# Patient Record
Sex: FEMALE | Race: BLACK OR AFRICAN AMERICAN | NOT HISPANIC OR LATINO | URBAN - METROPOLITAN AREA
[De-identification: names, ages, dates, MRNs, and addresses within clinical notes are randomized per-mention and may not be internally consistent; named-entity substitution may affect disease eponyms.]

---

## 2023-11-24 ENCOUNTER — EMERGENCY (EMERGENCY)
Facility: HOSPITAL | Age: 53
LOS: 1 days | Discharge: HOME | End: 2023-11-24
Attending: EMERGENCY MEDICINE
Payer: COMMERCIAL

## 2023-11-24 VITALS
DIASTOLIC BLOOD PRESSURE: 74 MMHG | RESPIRATION RATE: 16 BRPM | SYSTOLIC BLOOD PRESSURE: 136 MMHG | TEMPERATURE: 98 F | OXYGEN SATURATION: 99 % | HEART RATE: 88 BPM

## 2023-11-24 VITALS
SYSTOLIC BLOOD PRESSURE: 154 MMHG | OXYGEN SATURATION: 100 % | RESPIRATION RATE: 18 BRPM | TEMPERATURE: 98 F | HEART RATE: 108 BPM | DIASTOLIC BLOOD PRESSURE: 88 MMHG | WEIGHT: 196.21 LBS

## 2023-11-24 PROCEDURE — 99284 EMERGENCY DEPT VISIT MOD MDM: CPT

## 2023-11-24 PROCEDURE — 96375 TX/PRO/DX INJ NEW DRUG ADDON: CPT

## 2023-11-24 PROCEDURE — 96365 THER/PROPH/DIAG IV INF INIT: CPT

## 2023-11-24 PROCEDURE — 99284 EMERGENCY DEPT VISIT MOD MDM: CPT | Mod: 25

## 2023-11-24 RX ORDER — OXYCODONE AND ACETAMINOPHEN 5; 325 MG/1; MG/1
1 TABLET ORAL
Qty: 9 | Refills: 0
Start: 2023-11-24 | End: 2023-11-26

## 2023-11-24 RX ORDER — KETOROLAC TROMETHAMINE 30 MG/ML
30 SYRINGE (ML) INJECTION ONCE
Refills: 0 | Status: DISCONTINUED | OUTPATIENT
Start: 2023-11-24 | End: 2023-11-24

## 2023-11-24 RX ORDER — SODIUM CHLORIDE 9 MG/ML
3 INJECTION INTRAMUSCULAR; INTRAVENOUS; SUBCUTANEOUS ONCE
Refills: 0 | Status: COMPLETED | OUTPATIENT
Start: 2023-11-24 | End: 2023-11-24

## 2023-11-24 RX ORDER — MORPHINE SULFATE 50 MG/1
2 CAPSULE, EXTENDED RELEASE ORAL ONCE
Refills: 0 | Status: DISCONTINUED | OUTPATIENT
Start: 2023-11-24 | End: 2023-11-24

## 2023-11-24 RX ORDER — DEXAMETHASONE 0.5 MG/5ML
10 ELIXIR ORAL ONCE
Refills: 0 | Status: COMPLETED | OUTPATIENT
Start: 2023-11-24 | End: 2023-11-24

## 2023-11-24 RX ADMIN — Medication 30 MILLIGRAM(S): at 16:56

## 2023-11-24 RX ADMIN — MORPHINE SULFATE 2 MILLIGRAM(S): 50 CAPSULE, EXTENDED RELEASE ORAL at 17:27

## 2023-11-24 RX ADMIN — Medication 30 MILLIGRAM(S): at 17:26

## 2023-11-24 RX ADMIN — Medication 102 MILLIGRAM(S): at 17:05

## 2023-11-24 RX ADMIN — Medication 10 MILLIGRAM(S): at 17:35

## 2023-11-24 RX ADMIN — MORPHINE SULFATE 2 MILLIGRAM(S): 50 CAPSULE, EXTENDED RELEASE ORAL at 16:57

## 2023-11-24 RX ADMIN — SODIUM CHLORIDE 3 MILLILITER(S): 9 INJECTION INTRAMUSCULAR; INTRAVENOUS; SUBCUTANEOUS at 17:01

## 2023-11-24 NOTE — ED PROVIDER NOTE - MUSCULOSKELETAL, MLM
Marked tenderness over lower back but able to weight-bear. Walks slowly, reflexes equal and symmetrical. Sensations okay.

## 2023-11-24 NOTE — ED PROVIDER NOTE - PATIENT PORTAL LINK FT
You can access the FollowMyHealth Patient Portal offered by [facility] by registering at the following website: [email or web address]. By joining [facility] ’s FollowMyHealth portal, you will also be able to view your health information using other applications (apps) compatible with our system.

## 2023-11-24 NOTE — ED PROVIDER NOTE - NSFOLLOWUPINSTRUCTIONS_ED_ALL_ED_FT
take meds as directed  follow up with your doctor          English    Managing Chronic Back Pain  Chronic back pain is back pain that lasts for 12 weeks or longer. It often affects the lower back. Back pain may feel like a muscle ache or a sharp, stabbing pain. It can be mild, moderate, or severe.    If you have been diagnosed with chronic back pain, there are things you can do to manage your symptoms. You may have to try different things to see what works best for you. Your health care provider may also give you specific instructions.    How to manage lifestyle changes  Treating chronic back pain often starts with rest and pain relief, followed by exercises to restore movement and strength to your back (physical therapy). You may need surgery if other treatments do not help, or if your pain is caused by a condition or an injury. Follow your treatment plan as told by your health care provider. This may include:  Relaxation techniques.  Talk therapy or counseling with a mental health specialist. A form of talk therapy called cognitive behavioral therapy (CBT) can be especially helpful. This therapy helps you set goals and follow up on the changes that you make.  Acupuncture or massage therapy.  Local electrical stimulation.  Injections. These deliver numbing or pain-relieving medicines into your spine or the area of pain.  How to recognize changes in your chronic back pain  Your condition may improve with treatment. However, back pain may not go away or may get worse over time. Watch your symptoms carefully and let your health care provider know if your symptoms get worse or do not improve.    Your back pain may be getting worse if you have:  Pain that begins to cause problems with posture.  Pain that gets worse when you are sitting, standing, walking, bending, or lifting.  Pain that affects you while you are active, or at rest, or both.  Pain that eventually makes it hard to move around (limits mobility).  Pain that occurs with fever, weight loss, or difficulty urinating.  Pain that causes numbness and tingling.  How to use body mechanics and posture to help with pain  Healthy body mechanics and good posture can help to relieve stress on your back. Body mechanics refers to the movements and positions of your body during your daily activities. Posture is part of body mechanics. Good posture means:  Your spine is in its natural S-curve, or neutral, position.  Your shoulders are pulled back slightly.  Your head is not tipped forward.  Follow these guidelines to improve your posture and body mechanics in your everyday activities.    Standing      When standing, keep your spine neutral and your feet about hip-width apart. Keep your knees slightly bent. Your ears, shoulders, and hips should line up.  When you do a task in which you  one place for a long time, place one foot on a stable object that is 2–4 inches (5–10 cm) high, such as a footstool. This helps keep your spine neutral.  Sitting      When sitting, keep your spine neutral and your feet flat on the floor. Use a footrest, if necessary, and keep your thighs parallel to the floor. Avoid rounding your shoulders, and avoid tilting your head forward.  When working at a desk or a computer, keep your desk at a height where your hands are slightly lower than your elbows. Slide your chair under your desk so you are close enough to maintain good posture.  When working at a computer, place your monitor at a height where you are looking straight ahead and you do not have to tilt your head forward or downward to view the screen.  Lifting      Keep your feet at least shoulder-width apart and tighten the muscles of your abdomen.  Bend your knees and hips and keep your spine neutral. Be sure to lift using the strength of your legs, not your back. Do not lock your knees straight out.  Always ask for help to lift heavy or awkward objects.  Resting      When lying down and resting, avoid positions that are most painful.  If you have pain with activities such as sitting, bending, stooping, or squatting, lie in a position in which your body does not bend very much. For example, avoid curling up on your side with your arms and knees near your chest (fetal position).  If you have pain with activities such as standing for a long time or reaching with your arms, lie with your spine in a neutral position and bend your knees slightly. Try:  Lying on your side with a pillow between your knees.  Lying on your back with a pillow under your knees.  Follow these instructions at home:  Medicines    Treatment may include over-the-counter or prescription medicines for pain and inflammation that are taken by mouth or applied to the skin. Another treatment may include muscle relaxants. Take over-the-counter and prescription medicines only as told by your health care provider.  Ask your health care provider if the medicine prescribed to you:  Requires you to avoid driving or using machinery.  Can cause constipation. You may need to take these actions to prevent or treat constipation:  Drink enough fluid to keep your urine pale yellow.  Take over-the-counter or prescription medicines.  Eat foods that are high in fiber, such as beans, whole grains, and fresh fruits and vegetables.  Limit foods that are high in fat and processed sugars, such as fried or sweet foods.  Lifestyle    Do not use any products that contain nicotine or tobacco, such as cigarettes, e-cigarettes, and chewing tobacco. If you need help quitting, ask your health care provider.  Eat a healthy diet that includes foods such as vegetables, fruits, fish, and lean meats.  Work with your health care provider to achieve or maintain a healthy weight.  General instructions    Get regular exercise as told. Exercise improves flexibility and strength.  If physical therapy was prescribed, do exercises as told by your health care provider.  Use ice or heat therapy as told by your health care provider.  Keep all follow-up visits as told by your health care provider. This is important.  Where can I get support?  Consider joining a support group for people managing chronic back pain. Ask your health care provider about support groups in your area. You can also find online and in-person support groups through:  The American Chronic Pain Association: theacpa.org  Pain Connection Program: painconnection.org  Contact a health care provider if:  You have pain that is not relieved with rest or medicine.  Your pain gets worse, or you have new pain.  You have a fever.  You have rapid weight loss.  You have trouble doing your normal activities.  Get help right away if:  You have weakness or numbness in one or both of your legs or feet.  You have trouble controlling your bladder or your bowels.  You have severe back pain and have any of the following:  Nausea or vomiting.  Abdominal pain.  Shortness of breath or you faint.  Summary  Chronic back pain is often treated with rest, pain relief, and physical therapy.  Talk therapy, acupuncture, massage, and local electrical stimulation may help.  Follow your treatment plan as told by your health care provider.  Joining a support group may help you manage chronic back pain.  This information is not intended to replace advice given to you by your health care provider. Make sure you discuss any questions you have with your health care provider.    Document Revised: 01/28/2021 Document Reviewed: 10/06/2020  Elsevier Patient Education © 2023 [email or web address]

## 2023-11-24 NOTE — ED ADULT TRIAGE NOTE - CHIEF COMPLAINT QUOTE
Pt c/o lower back pain, hx multiple back injuries in 2007 and 2018, following up with orthopedics outpatient, last MRI done earlier this year, worsening back pain today

## 2023-11-24 NOTE — ED PROVIDER NOTE - OBJECTIVE STATEMENT
53 year old female with history of herniated disc in neck and thoracic and lumbar spine presents to ED complaining of back pain. She is being treated by a doctor but complains of severe pain currently. Denies fever, chills, nausea, vomiting, or incontinence. Able to weight-bear and walk. Patient states she used to work for [employer]. [employer].

## 2023-12-09 ENCOUNTER — EMERGENCY (EMERGENCY)
Facility: HOSPITAL | Age: 53
LOS: 1 days | Discharge: HOME | End: 2023-12-09
Attending: EMERGENCY MEDICINE
Payer: COMMERCIAL

## 2023-12-09 VITALS
HEART RATE: 112 BPM | SYSTOLIC BLOOD PRESSURE: 139 MMHG | DIASTOLIC BLOOD PRESSURE: 94 MMHG | TEMPERATURE: 98 F | OXYGEN SATURATION: 98 % | WEIGHT: 179.9 LBS | RESPIRATION RATE: 18 BRPM | HEIGHT: 63 IN

## 2023-12-09 VITALS — HEART RATE: 104 BPM

## 2023-12-09 LAB
ALBUMIN SERPL ELPH-MCNC: 3.3 G/DL — LOW (ref 3.5–5)
ALBUMIN SERPL ELPH-MCNC: 3.3 G/DL — LOW (ref 3.5–5)
ALP SERPL-CCNC: 77 U/L — SIGNIFICANT CHANGE UP (ref 40–120)
ALP SERPL-CCNC: 77 U/L — SIGNIFICANT CHANGE UP (ref 40–120)
ALT FLD-CCNC: 32 U/L DA — SIGNIFICANT CHANGE UP (ref 10–60)
ALT FLD-CCNC: 32 U/L DA — SIGNIFICANT CHANGE UP (ref 10–60)
AMPHET UR-MCNC: NEGATIVE — SIGNIFICANT CHANGE UP
AMPHET UR-MCNC: NEGATIVE — SIGNIFICANT CHANGE UP
ANION GAP SERPL CALC-SCNC: 3 MMOL/L — LOW (ref 5–17)
ANION GAP SERPL CALC-SCNC: 3 MMOL/L — LOW (ref 5–17)
APPEARANCE UR: CLEAR — SIGNIFICANT CHANGE UP
APPEARANCE UR: CLEAR — SIGNIFICANT CHANGE UP
AST SERPL-CCNC: 20 U/L — SIGNIFICANT CHANGE UP (ref 10–40)
AST SERPL-CCNC: 20 U/L — SIGNIFICANT CHANGE UP (ref 10–40)
BARBITURATES UR SCN-MCNC: NEGATIVE — SIGNIFICANT CHANGE UP
BARBITURATES UR SCN-MCNC: NEGATIVE — SIGNIFICANT CHANGE UP
BASOPHILS # BLD AUTO: 0.03 K/UL — SIGNIFICANT CHANGE UP (ref 0–0.2)
BASOPHILS # BLD AUTO: 0.03 K/UL — SIGNIFICANT CHANGE UP (ref 0–0.2)
BASOPHILS NFR BLD AUTO: 0.4 % — SIGNIFICANT CHANGE UP (ref 0–2)
BASOPHILS NFR BLD AUTO: 0.4 % — SIGNIFICANT CHANGE UP (ref 0–2)
BENZODIAZ UR-MCNC: NEGATIVE — SIGNIFICANT CHANGE UP
BENZODIAZ UR-MCNC: NEGATIVE — SIGNIFICANT CHANGE UP
BILIRUB SERPL-MCNC: 0.3 MG/DL — SIGNIFICANT CHANGE UP (ref 0.2–1.2)
BILIRUB SERPL-MCNC: 0.3 MG/DL — SIGNIFICANT CHANGE UP (ref 0.2–1.2)
BILIRUB UR-MCNC: NEGATIVE — SIGNIFICANT CHANGE UP
BILIRUB UR-MCNC: NEGATIVE — SIGNIFICANT CHANGE UP
BUN SERPL-MCNC: 12 MG/DL — SIGNIFICANT CHANGE UP (ref 7–18)
BUN SERPL-MCNC: 12 MG/DL — SIGNIFICANT CHANGE UP (ref 7–18)
CALCIUM SERPL-MCNC: 8.7 MG/DL — SIGNIFICANT CHANGE UP (ref 8.4–10.5)
CALCIUM SERPL-MCNC: 8.7 MG/DL — SIGNIFICANT CHANGE UP (ref 8.4–10.5)
CHLORIDE SERPL-SCNC: 108 MMOL/L — SIGNIFICANT CHANGE UP (ref 96–108)
CHLORIDE SERPL-SCNC: 108 MMOL/L — SIGNIFICANT CHANGE UP (ref 96–108)
CK SERPL-CCNC: 126 U/L — SIGNIFICANT CHANGE UP (ref 21–215)
CK SERPL-CCNC: 126 U/L — SIGNIFICANT CHANGE UP (ref 21–215)
CO2 SERPL-SCNC: 28 MMOL/L — SIGNIFICANT CHANGE UP (ref 22–31)
CO2 SERPL-SCNC: 28 MMOL/L — SIGNIFICANT CHANGE UP (ref 22–31)
COCAINE METAB.OTHER UR-MCNC: NEGATIVE — SIGNIFICANT CHANGE UP
COCAINE METAB.OTHER UR-MCNC: NEGATIVE — SIGNIFICANT CHANGE UP
COLOR SPEC: YELLOW — SIGNIFICANT CHANGE UP
COLOR SPEC: YELLOW — SIGNIFICANT CHANGE UP
CREAT SERPL-MCNC: 1.01 MG/DL — SIGNIFICANT CHANGE UP (ref 0.5–1.3)
CREAT SERPL-MCNC: 1.01 MG/DL — SIGNIFICANT CHANGE UP (ref 0.5–1.3)
DIFF PNL FLD: NEGATIVE — SIGNIFICANT CHANGE UP
DIFF PNL FLD: NEGATIVE — SIGNIFICANT CHANGE UP
EGFR: 66 ML/MIN/1.73M2 — SIGNIFICANT CHANGE UP
EGFR: 66 ML/MIN/1.73M2 — SIGNIFICANT CHANGE UP
EOSINOPHIL # BLD AUTO: 0.2 K/UL — SIGNIFICANT CHANGE UP (ref 0–0.5)
EOSINOPHIL # BLD AUTO: 0.2 K/UL — SIGNIFICANT CHANGE UP (ref 0–0.5)
EOSINOPHIL NFR BLD AUTO: 2.6 % — SIGNIFICANT CHANGE UP (ref 0–6)
EOSINOPHIL NFR BLD AUTO: 2.6 % — SIGNIFICANT CHANGE UP (ref 0–6)
GLUCOSE SERPL-MCNC: 97 MG/DL — SIGNIFICANT CHANGE UP (ref 70–99)
GLUCOSE SERPL-MCNC: 97 MG/DL — SIGNIFICANT CHANGE UP (ref 70–99)
GLUCOSE UR QL: NEGATIVE MG/DL — SIGNIFICANT CHANGE UP
GLUCOSE UR QL: NEGATIVE MG/DL — SIGNIFICANT CHANGE UP
HCT VFR BLD CALC: 38 % — SIGNIFICANT CHANGE UP (ref 34.5–45)
HCT VFR BLD CALC: 38 % — SIGNIFICANT CHANGE UP (ref 34.5–45)
HGB BLD-MCNC: 12.2 G/DL — SIGNIFICANT CHANGE UP (ref 11.5–15.5)
HGB BLD-MCNC: 12.2 G/DL — SIGNIFICANT CHANGE UP (ref 11.5–15.5)
IMM GRANULOCYTES NFR BLD AUTO: 0.3 % — SIGNIFICANT CHANGE UP (ref 0–0.9)
IMM GRANULOCYTES NFR BLD AUTO: 0.3 % — SIGNIFICANT CHANGE UP (ref 0–0.9)
KETONES UR-MCNC: ABNORMAL MG/DL
KETONES UR-MCNC: ABNORMAL MG/DL
LEUKOCYTE ESTERASE UR-ACNC: NEGATIVE — SIGNIFICANT CHANGE UP
LEUKOCYTE ESTERASE UR-ACNC: NEGATIVE — SIGNIFICANT CHANGE UP
LIDOCAIN IGE QN: 27 U/L — SIGNIFICANT CHANGE UP (ref 13–75)
LIDOCAIN IGE QN: 27 U/L — SIGNIFICANT CHANGE UP (ref 13–75)
LYMPHOCYTES # BLD AUTO: 3 K/UL — SIGNIFICANT CHANGE UP (ref 1–3.3)
LYMPHOCYTES # BLD AUTO: 3 K/UL — SIGNIFICANT CHANGE UP (ref 1–3.3)
LYMPHOCYTES # BLD AUTO: 39.4 % — SIGNIFICANT CHANGE UP (ref 13–44)
LYMPHOCYTES # BLD AUTO: 39.4 % — SIGNIFICANT CHANGE UP (ref 13–44)
MAGNESIUM SERPL-MCNC: 1.9 MG/DL — SIGNIFICANT CHANGE UP (ref 1.6–2.6)
MAGNESIUM SERPL-MCNC: 1.9 MG/DL — SIGNIFICANT CHANGE UP (ref 1.6–2.6)
MCHC RBC-ENTMCNC: 25.6 PG — LOW (ref 27–34)
MCHC RBC-ENTMCNC: 25.6 PG — LOW (ref 27–34)
MCHC RBC-ENTMCNC: 32.1 GM/DL — SIGNIFICANT CHANGE UP (ref 32–36)
MCHC RBC-ENTMCNC: 32.1 GM/DL — SIGNIFICANT CHANGE UP (ref 32–36)
MCV RBC AUTO: 79.7 FL — LOW (ref 80–100)
MCV RBC AUTO: 79.7 FL — LOW (ref 80–100)
METHADONE UR-MCNC: NEGATIVE — SIGNIFICANT CHANGE UP
METHADONE UR-MCNC: NEGATIVE — SIGNIFICANT CHANGE UP
MONOCYTES # BLD AUTO: 0.51 K/UL — SIGNIFICANT CHANGE UP (ref 0–0.9)
MONOCYTES # BLD AUTO: 0.51 K/UL — SIGNIFICANT CHANGE UP (ref 0–0.9)
MONOCYTES NFR BLD AUTO: 6.7 % — SIGNIFICANT CHANGE UP (ref 2–14)
MONOCYTES NFR BLD AUTO: 6.7 % — SIGNIFICANT CHANGE UP (ref 2–14)
NEUTROPHILS # BLD AUTO: 3.85 K/UL — SIGNIFICANT CHANGE UP (ref 1.8–7.4)
NEUTROPHILS # BLD AUTO: 3.85 K/UL — SIGNIFICANT CHANGE UP (ref 1.8–7.4)
NEUTROPHILS NFR BLD AUTO: 50.6 % — SIGNIFICANT CHANGE UP (ref 43–77)
NEUTROPHILS NFR BLD AUTO: 50.6 % — SIGNIFICANT CHANGE UP (ref 43–77)
NITRITE UR-MCNC: NEGATIVE — SIGNIFICANT CHANGE UP
NITRITE UR-MCNC: NEGATIVE — SIGNIFICANT CHANGE UP
NRBC # BLD: 0 /100 WBCS — SIGNIFICANT CHANGE UP (ref 0–0)
NRBC # BLD: 0 /100 WBCS — SIGNIFICANT CHANGE UP (ref 0–0)
OPIATES UR-MCNC: NEGATIVE — SIGNIFICANT CHANGE UP
OPIATES UR-MCNC: NEGATIVE — SIGNIFICANT CHANGE UP
PCP SPEC-MCNC: SIGNIFICANT CHANGE UP
PCP SPEC-MCNC: SIGNIFICANT CHANGE UP
PCP UR-MCNC: NEGATIVE — SIGNIFICANT CHANGE UP
PCP UR-MCNC: NEGATIVE — SIGNIFICANT CHANGE UP
PH UR: 5.5 — SIGNIFICANT CHANGE UP (ref 5–8)
PH UR: 5.5 — SIGNIFICANT CHANGE UP (ref 5–8)
PLATELET # BLD AUTO: 276 K/UL — SIGNIFICANT CHANGE UP (ref 150–400)
PLATELET # BLD AUTO: 276 K/UL — SIGNIFICANT CHANGE UP (ref 150–400)
POTASSIUM SERPL-MCNC: 3.8 MMOL/L — SIGNIFICANT CHANGE UP (ref 3.5–5.3)
POTASSIUM SERPL-MCNC: 3.8 MMOL/L — SIGNIFICANT CHANGE UP (ref 3.5–5.3)
POTASSIUM SERPL-SCNC: 3.8 MMOL/L — SIGNIFICANT CHANGE UP (ref 3.5–5.3)
POTASSIUM SERPL-SCNC: 3.8 MMOL/L — SIGNIFICANT CHANGE UP (ref 3.5–5.3)
PROT SERPL-MCNC: 7 G/DL — SIGNIFICANT CHANGE UP (ref 6–8.3)
PROT SERPL-MCNC: 7 G/DL — SIGNIFICANT CHANGE UP (ref 6–8.3)
PROT UR-MCNC: NEGATIVE MG/DL — SIGNIFICANT CHANGE UP
PROT UR-MCNC: NEGATIVE MG/DL — SIGNIFICANT CHANGE UP
RBC # BLD: 4.77 M/UL — SIGNIFICANT CHANGE UP (ref 3.8–5.2)
RBC # BLD: 4.77 M/UL — SIGNIFICANT CHANGE UP (ref 3.8–5.2)
RBC # FLD: 15.9 % — HIGH (ref 10.3–14.5)
RBC # FLD: 15.9 % — HIGH (ref 10.3–14.5)
SODIUM SERPL-SCNC: 139 MMOL/L — SIGNIFICANT CHANGE UP (ref 135–145)
SODIUM SERPL-SCNC: 139 MMOL/L — SIGNIFICANT CHANGE UP (ref 135–145)
SP GR SPEC: 1.02 — SIGNIFICANT CHANGE UP (ref 1–1.03)
SP GR SPEC: 1.02 — SIGNIFICANT CHANGE UP (ref 1–1.03)
T4 AB SER-ACNC: 9 UG/DL — SIGNIFICANT CHANGE UP (ref 4.6–12)
T4 AB SER-ACNC: 9 UG/DL — SIGNIFICANT CHANGE UP (ref 4.6–12)
THC UR QL: NEGATIVE — SIGNIFICANT CHANGE UP
THC UR QL: NEGATIVE — SIGNIFICANT CHANGE UP
TROPONIN I, HIGH SENSITIVITY RESULT: 43.1 NG/L — SIGNIFICANT CHANGE UP
TROPONIN I, HIGH SENSITIVITY RESULT: 43.1 NG/L — SIGNIFICANT CHANGE UP
TSH SERPL-MCNC: 1.35 UU/ML — SIGNIFICANT CHANGE UP (ref 0.34–4.82)
TSH SERPL-MCNC: 1.35 UU/ML — SIGNIFICANT CHANGE UP (ref 0.34–4.82)
UROBILINOGEN FLD QL: 1 MG/DL — SIGNIFICANT CHANGE UP (ref 0.2–1)
UROBILINOGEN FLD QL: 1 MG/DL — SIGNIFICANT CHANGE UP (ref 0.2–1)
WBC # BLD: 7.61 K/UL — SIGNIFICANT CHANGE UP (ref 3.8–10.5)
WBC # BLD: 7.61 K/UL — SIGNIFICANT CHANGE UP (ref 3.8–10.5)
WBC # FLD AUTO: 7.61 K/UL — SIGNIFICANT CHANGE UP (ref 3.8–10.5)
WBC # FLD AUTO: 7.61 K/UL — SIGNIFICANT CHANGE UP (ref 3.8–10.5)

## 2023-12-09 PROCEDURE — 71045 X-RAY EXAM CHEST 1 VIEW: CPT

## 2023-12-09 PROCEDURE — 83690 ASSAY OF LIPASE: CPT

## 2023-12-09 PROCEDURE — 84484 ASSAY OF TROPONIN QUANT: CPT

## 2023-12-09 PROCEDURE — 80307 DRUG TEST PRSMV CHEM ANLYZR: CPT

## 2023-12-09 PROCEDURE — 36415 COLL VENOUS BLD VENIPUNCTURE: CPT

## 2023-12-09 PROCEDURE — 84480 ASSAY TRIIODOTHYRONINE (T3): CPT

## 2023-12-09 PROCEDURE — 71045 X-RAY EXAM CHEST 1 VIEW: CPT | Mod: 26

## 2023-12-09 PROCEDURE — 85025 COMPLETE CBC W/AUTO DIFF WBC: CPT

## 2023-12-09 PROCEDURE — 99285 EMERGENCY DEPT VISIT HI MDM: CPT | Mod: 25

## 2023-12-09 PROCEDURE — 99285 EMERGENCY DEPT VISIT HI MDM: CPT

## 2023-12-09 PROCEDURE — 80053 COMPREHEN METABOLIC PANEL: CPT

## 2023-12-09 PROCEDURE — 82550 ASSAY OF CK (CPK): CPT

## 2023-12-09 PROCEDURE — 84443 ASSAY THYROID STIM HORMONE: CPT

## 2023-12-09 PROCEDURE — 96360 HYDRATION IV INFUSION INIT: CPT

## 2023-12-09 PROCEDURE — 84436 ASSAY OF TOTAL THYROXINE: CPT

## 2023-12-09 PROCEDURE — 83735 ASSAY OF MAGNESIUM: CPT

## 2023-12-09 PROCEDURE — 93005 ELECTROCARDIOGRAM TRACING: CPT

## 2023-12-09 PROCEDURE — 81003 URINALYSIS AUTO W/O SCOPE: CPT

## 2023-12-09 PROCEDURE — 84439 ASSAY OF FREE THYROXINE: CPT

## 2023-12-09 RX ORDER — SODIUM CHLORIDE 9 MG/ML
1000 INJECTION INTRAMUSCULAR; INTRAVENOUS; SUBCUTANEOUS ONCE
Refills: 0 | Status: COMPLETED | OUTPATIENT
Start: 2023-12-09 | End: 2023-12-09

## 2023-12-09 RX ADMIN — Medication 1 MILLIGRAM(S): at 21:10

## 2023-12-09 RX ADMIN — SODIUM CHLORIDE 1000 MILLILITER(S): 9 INJECTION INTRAMUSCULAR; INTRAVENOUS; SUBCUTANEOUS at 21:10

## 2023-12-09 RX ADMIN — SODIUM CHLORIDE 1000 MILLILITER(S): 9 INJECTION INTRAMUSCULAR; INTRAVENOUS; SUBCUTANEOUS at 22:10

## 2023-12-09 NOTE — ED PROVIDER NOTE - NSFOLLOWUPINSTRUCTIONS_ED_ALL_ED_FT
Palpitations  Palpitations are feelings that your heartbeat is irregular or is faster than normal. It may feel like your heart is fluttering or skipping a beat. Palpitations may be caused by many things, including smoking, caffeine, alcohol, stress, and certain medicines or drugs. Most causes of palpitations are not serious.    However, some palpitations can be a sign of a serious problem. Further tests and a thorough medical history will be done to find the cause of your palpitations. Your provider may order tests such as an ECG, labs, an echocardiogram, or an ambulatory continuous ECG monitor.    Follow these instructions at home:  Pay attention to any changes in your symptoms. Let your health care provider know about them. Take these actions to help manage your symptoms:    Eating and drinking    Follow instructions from your health care provider about eating or drinking restrictions. You may need to avoid foods and drinks that may cause palpitations. These may include:  Caffeinated coffee, tea, soft drinks, and energy drinks.  Chocolate.  Alcohol.  Diet pills.  Lifestyle    A person sitting on the floor doing yoga.  A sign telling the reader not to smoke.  Take steps to reduce your stress and anxiety. Things that can help you relax include:  Yoga.  Mind-body activities, such as deep breathing, meditation, or using words and images to create positive thoughts (guided imagery).  Physical activity, such as swimming, jogging, or walking. Tell your health care provider if your palpitations increase with activity. If you have chest pain or shortness of breath with activity, do not continue the activity until you are seen by your health care provider.  Biofeedback. This is a method that helps you learn to use your mind to control things in your body, such as your heartbeat.  Get plenty of rest and sleep. Keep a regular bed time.  Do not use drugs, including cocaine or ecstasy. Do not use marijuana.  Do not use any products that contain nicotine or tobacco. These products include cigarettes, chewing tobacco, and vaping devices, such as e-cigarettes. If you need help quitting, ask your health care provider.  General instructions    Take over-the-counter and prescription medicines only as told by your health care provider.  Keep all follow-up visits. This is important. These may include visits for further testing if palpitations do not go away or get worse.  Contact a health care provider if:  You continue to have a fast or irregular heartbeat for a long period of time.  You notice that your palpitations occur more often.  Get help right away if:  You have chest pain or shortness of breath.  You have a severe headache.  You feel dizzy or you faint.  These symptoms may represent a serious problem that is an emergency. Do not wait to see if the symptoms will go away. Get medical help right away. Call your local emergency services (911 in the [place] Do not drive yourself to the hospital.    Summary  Palpitations are feelings that your heartbeat is irregular or is faster than normal. It may feel like your heart is fluttering or skipping a beat.  Palpitations may be caused by many things, including smoking, caffeine, alcohol, stress, certain medicines, and drugs.  Further tests and a thorough medical history may be done to find the cause of your palpitations.  Get help right away if you faint or have chest pain, shortness of breath, severe headache, or dizziness.  This information is not intended to replace advice given to you by your health care provider. Make sure you discuss any questions you have with your health care provider.      check your pulse as directed  if your heart is raising, not feeling well, go to ED  otherwise follow up with cardiology outpatient referral

## 2023-12-09 NOTE — ED ADULT NURSE NOTE - NSFALLUNIVINTERV_ED_ALL_ED
Bed/Stretcher in lowest position, wheels locked, appropriate side rails in place/Call bell, personal items and telephone in reach/Instruct patient to call for assistance before getting out of bed/chair/stretcher/Non-slip footwear applied when patient is off stretcher/[place] to call system/Physically safe environment - no spills, clutter or unnecessary equipment/Purposeful proactive rounding/Room/bathroom lighting operational, light cord in reach Bed/Stretcher in lowest position, wheels locked, appropriate side rails in place/Call bell, personal items and telephone in reach/Instruct patient to call for assistance before getting out of bed/chair/stretcher/Non-slip footwear applied when patient is off stretcher/[place] to call system/Physically safe environment - no spills, clutter or unnecessary equipment/Purposeful proactive rounding/Room/bathroom lighting operational, light cord in reach

## 2023-12-09 NOTE — ED PROVIDER NOTE - CLINICAL SUMMARY MEDICAL DECISION MAKING FREE TEXT BOX
Patient with on and off tachycardia for past few weeks, concern for SVT versus sinus tach versus other arrhythmia.  Will get EKG, TFT, troponin, and reassess

## 2023-12-09 NOTE — ED PROVIDER NOTE - PATIENT PORTAL LINK FT
You can access the FollowMyHealth Patient Portal offered by [facility] by registering at the following website: [email or web address]. By joining [facility]’s FollowMyHealth portal, you will also be able to view your health information using other applications (apps) compatible with our system. You can access the FollowMyHealth Patient Portal offered by [facility] by registering at the following website: [email or web address]. By joining [facility]’s FollowMyHealth portal, you will also be able to view your health information using other applications (apps) compatible with our system.

## 2023-12-09 NOTE — ED PROVIDER NOTE - OBJECTIVE STATEMENT
53-year-old male female with history of anemia, heart murmur, chronic back pain, LMP 2 weeks ago, patient claims for past few weeks has been having heart racing on and off.  Last episode 5 AM with significant heart racing, and she became very nervous.  Patient not sure if she developed any chest pain, but admits to having lightheadedness.  Patient denies fever, N/V, coughing, recent traveling

## 2023-12-09 NOTE — ED ADULT NURSE NOTE - OBJECTIVE STATEMENT
Patient presented to the ED complaining of palpitations that woke her up from sleep. Patient states she has been having several episodes lasting around 15min each time and today was the worst.

## 2023-12-09 NOTE — ED PROVIDER NOTE - PROGRESS NOTE DETAILS
Lab/EKG/CXR explained to patient.    Patient appears to be quite anxious, will give Ativan and reassess.  Heart rate 93-1 02 Lab/EKG/CXR explained to patient.    Patient appears to be quite anxious, will give Ativan and reassess.  Heart rate  case d/w hospitalist Dr. Cruz to admit pt  was seen by Dr. Crzu  Pt now states she has an appt with her workman's comp on Mon & is the appt she cannot miss Pt's HR , will d/c home.  Advised if condition worsens, (instruct pt how to take her pulse) to return to ED.  Otherwise to f/u with PCP & cardiology case d/w hospitalist Dr. Cruz to admit pt  was seen by Dr. Cruz  Pt now states she has an appt with her workman's comp on Mon & is the appt she cannot miss Pt's HR , will d/c home.  Advised if condition worsens, (instruct pt how to take her pulse) to return to ED.  Otherwise to f/u with PCP & cardiology

## 2023-12-10 PROBLEM — M51.26 OTHER INTERVERTEBRAL DISC DISPLACEMENT, LUMBAR REGION: Chronic | Status: ACTIVE | Noted: 2023-11-24

## 2023-12-10 LAB
T3 SERPL-MCNC: 109 NG/DL — SIGNIFICANT CHANGE UP (ref 80–200)
T3 SERPL-MCNC: 109 NG/DL — SIGNIFICANT CHANGE UP (ref 80–200)
T4 FREE SERPL-MCNC: 1.2 NG/DL — SIGNIFICANT CHANGE UP (ref 0.9–1.8)
T4 FREE SERPL-MCNC: 1.2 NG/DL — SIGNIFICANT CHANGE UP (ref 0.9–1.8)

## 2023-12-11 ENCOUNTER — INPATIENT (INPATIENT)
Facility: HOSPITAL | Age: 53
LOS: 0 days | Discharge: HOME | DRG: 305 | End: 2023-12-12
Attending: STUDENT IN AN ORGANIZED HEALTH CARE EDUCATION/TRAINING PROGRAM | Admitting: STUDENT IN AN ORGANIZED HEALTH CARE EDUCATION/TRAINING PROGRAM
Payer: COMMERCIAL

## 2023-12-11 VITALS
DIASTOLIC BLOOD PRESSURE: 101 MMHG | TEMPERATURE: 98 F | HEART RATE: 96 BPM | OXYGEN SATURATION: 98 % | WEIGHT: 184.97 LBS | SYSTOLIC BLOOD PRESSURE: 176 MMHG | RESPIRATION RATE: 22 BRPM | HEIGHT: 63 IN

## 2023-12-11 DIAGNOSIS — Z29.9 ENCOUNTER FOR PROPHYLACTIC MEASURES, UNSPECIFIED: ICD-10-CM

## 2023-12-11 DIAGNOSIS — R00.0 TACHYCARDIA, UNSPECIFIED: ICD-10-CM

## 2023-12-11 DIAGNOSIS — I16.0 HYPERTENSIVE URGENCY: ICD-10-CM

## 2023-12-11 LAB
ALBUMIN SERPL ELPH-MCNC: 3.5 G/DL — SIGNIFICANT CHANGE UP (ref 3.5–5)
ALBUMIN SERPL ELPH-MCNC: 3.5 G/DL — SIGNIFICANT CHANGE UP (ref 3.5–5)
ALP SERPL-CCNC: 82 U/L — SIGNIFICANT CHANGE UP (ref 40–120)
ALP SERPL-CCNC: 82 U/L — SIGNIFICANT CHANGE UP (ref 40–120)
ALT FLD-CCNC: 26 U/L DA — SIGNIFICANT CHANGE UP (ref 10–60)
ALT FLD-CCNC: 26 U/L DA — SIGNIFICANT CHANGE UP (ref 10–60)
ANION GAP SERPL CALC-SCNC: 2 MMOL/L — LOW (ref 5–17)
ANION GAP SERPL CALC-SCNC: 2 MMOL/L — LOW (ref 5–17)
AST SERPL-CCNC: 15 U/L — SIGNIFICANT CHANGE UP (ref 10–40)
AST SERPL-CCNC: 15 U/L — SIGNIFICANT CHANGE UP (ref 10–40)
BASOPHILS # BLD AUTO: 0.01 K/UL — SIGNIFICANT CHANGE UP (ref 0–0.2)
BASOPHILS # BLD AUTO: 0.01 K/UL — SIGNIFICANT CHANGE UP (ref 0–0.2)
BASOPHILS NFR BLD AUTO: 0.2 % — SIGNIFICANT CHANGE UP (ref 0–2)
BASOPHILS NFR BLD AUTO: 0.2 % — SIGNIFICANT CHANGE UP (ref 0–2)
BILIRUB SERPL-MCNC: 0.3 MG/DL — SIGNIFICANT CHANGE UP (ref 0.2–1.2)
BILIRUB SERPL-MCNC: 0.3 MG/DL — SIGNIFICANT CHANGE UP (ref 0.2–1.2)
BUN SERPL-MCNC: 14 MG/DL — SIGNIFICANT CHANGE UP (ref 7–18)
BUN SERPL-MCNC: 14 MG/DL — SIGNIFICANT CHANGE UP (ref 7–18)
CALCIUM SERPL-MCNC: 10 MG/DL — SIGNIFICANT CHANGE UP (ref 8.4–10.5)
CALCIUM SERPL-MCNC: 10 MG/DL — SIGNIFICANT CHANGE UP (ref 8.4–10.5)
CHLORIDE SERPL-SCNC: 106 MMOL/L — SIGNIFICANT CHANGE UP (ref 96–108)
CHLORIDE SERPL-SCNC: 106 MMOL/L — SIGNIFICANT CHANGE UP (ref 96–108)
CO2 SERPL-SCNC: 29 MMOL/L — SIGNIFICANT CHANGE UP (ref 22–31)
CO2 SERPL-SCNC: 29 MMOL/L — SIGNIFICANT CHANGE UP (ref 22–31)
CREAT SERPL-MCNC: 0.88 MG/DL — SIGNIFICANT CHANGE UP (ref 0.5–1.3)
CREAT SERPL-MCNC: 0.88 MG/DL — SIGNIFICANT CHANGE UP (ref 0.5–1.3)
D DIMER BLD IA.RAPID-MCNC: <150 NG/ML DDU — SIGNIFICANT CHANGE UP
D DIMER BLD IA.RAPID-MCNC: <150 NG/ML DDU — SIGNIFICANT CHANGE UP
EGFR: 78 ML/MIN/1.73M2 — SIGNIFICANT CHANGE UP
EGFR: 78 ML/MIN/1.73M2 — SIGNIFICANT CHANGE UP
EOSINOPHIL # BLD AUTO: 0.15 K/UL — SIGNIFICANT CHANGE UP (ref 0–0.5)
EOSINOPHIL # BLD AUTO: 0.15 K/UL — SIGNIFICANT CHANGE UP (ref 0–0.5)
EOSINOPHIL NFR BLD AUTO: 2.3 % — SIGNIFICANT CHANGE UP (ref 0–6)
EOSINOPHIL NFR BLD AUTO: 2.3 % — SIGNIFICANT CHANGE UP (ref 0–6)
GLUCOSE SERPL-MCNC: 99 MG/DL — SIGNIFICANT CHANGE UP (ref 70–99)
GLUCOSE SERPL-MCNC: 99 MG/DL — SIGNIFICANT CHANGE UP (ref 70–99)
HCT VFR BLD CALC: 39.8 % — SIGNIFICANT CHANGE UP (ref 34.5–45)
HCT VFR BLD CALC: 39.8 % — SIGNIFICANT CHANGE UP (ref 34.5–45)
HGB BLD-MCNC: 12.7 G/DL — SIGNIFICANT CHANGE UP (ref 11.5–15.5)
HGB BLD-MCNC: 12.7 G/DL — SIGNIFICANT CHANGE UP (ref 11.5–15.5)
IMM GRANULOCYTES NFR BLD AUTO: 0.5 % — SIGNIFICANT CHANGE UP (ref 0–0.9)
IMM GRANULOCYTES NFR BLD AUTO: 0.5 % — SIGNIFICANT CHANGE UP (ref 0–0.9)
LIDOCAIN IGE QN: 127 U/L — HIGH (ref 13–75)
LIDOCAIN IGE QN: 127 U/L — HIGH (ref 13–75)
LYMPHOCYTES # BLD AUTO: 2.41 K/UL — SIGNIFICANT CHANGE UP (ref 1–3.3)
LYMPHOCYTES # BLD AUTO: 2.41 K/UL — SIGNIFICANT CHANGE UP (ref 1–3.3)
LYMPHOCYTES # BLD AUTO: 36.6 % — SIGNIFICANT CHANGE UP (ref 13–44)
LYMPHOCYTES # BLD AUTO: 36.6 % — SIGNIFICANT CHANGE UP (ref 13–44)
MAGNESIUM SERPL-MCNC: 1.8 MG/DL — SIGNIFICANT CHANGE UP (ref 1.6–2.6)
MAGNESIUM SERPL-MCNC: 1.8 MG/DL — SIGNIFICANT CHANGE UP (ref 1.6–2.6)
MCHC RBC-ENTMCNC: 26.1 PG — LOW (ref 27–34)
MCHC RBC-ENTMCNC: 26.1 PG — LOW (ref 27–34)
MCHC RBC-ENTMCNC: 31.9 GM/DL — LOW (ref 32–36)
MCHC RBC-ENTMCNC: 31.9 GM/DL — LOW (ref 32–36)
MCV RBC AUTO: 81.7 FL — SIGNIFICANT CHANGE UP (ref 80–100)
MCV RBC AUTO: 81.7 FL — SIGNIFICANT CHANGE UP (ref 80–100)
MONOCYTES # BLD AUTO: 0.4 K/UL — SIGNIFICANT CHANGE UP (ref 0–0.9)
MONOCYTES # BLD AUTO: 0.4 K/UL — SIGNIFICANT CHANGE UP (ref 0–0.9)
MONOCYTES NFR BLD AUTO: 6.1 % — SIGNIFICANT CHANGE UP (ref 2–14)
MONOCYTES NFR BLD AUTO: 6.1 % — SIGNIFICANT CHANGE UP (ref 2–14)
NEUTROPHILS # BLD AUTO: 3.58 K/UL — SIGNIFICANT CHANGE UP (ref 1.8–7.4)
NEUTROPHILS # BLD AUTO: 3.58 K/UL — SIGNIFICANT CHANGE UP (ref 1.8–7.4)
NEUTROPHILS NFR BLD AUTO: 54.3 % — SIGNIFICANT CHANGE UP (ref 43–77)
NEUTROPHILS NFR BLD AUTO: 54.3 % — SIGNIFICANT CHANGE UP (ref 43–77)
NRBC # BLD: 0 /100 WBCS — SIGNIFICANT CHANGE UP (ref 0–0)
NRBC # BLD: 0 /100 WBCS — SIGNIFICANT CHANGE UP (ref 0–0)
NT-PROBNP SERPL-SCNC: 106 PG/ML — SIGNIFICANT CHANGE UP (ref 0–125)
NT-PROBNP SERPL-SCNC: 106 PG/ML — SIGNIFICANT CHANGE UP (ref 0–125)
PLATELET # BLD AUTO: 291 K/UL — SIGNIFICANT CHANGE UP (ref 150–400)
PLATELET # BLD AUTO: 291 K/UL — SIGNIFICANT CHANGE UP (ref 150–400)
POTASSIUM SERPL-MCNC: 3.7 MMOL/L — SIGNIFICANT CHANGE UP (ref 3.5–5.3)
POTASSIUM SERPL-MCNC: 3.7 MMOL/L — SIGNIFICANT CHANGE UP (ref 3.5–5.3)
POTASSIUM SERPL-SCNC: 3.7 MMOL/L — SIGNIFICANT CHANGE UP (ref 3.5–5.3)
POTASSIUM SERPL-SCNC: 3.7 MMOL/L — SIGNIFICANT CHANGE UP (ref 3.5–5.3)
PROT SERPL-MCNC: 7.5 G/DL — SIGNIFICANT CHANGE UP (ref 6–8.3)
PROT SERPL-MCNC: 7.5 G/DL — SIGNIFICANT CHANGE UP (ref 6–8.3)
RBC # BLD: 4.87 M/UL — SIGNIFICANT CHANGE UP (ref 3.8–5.2)
RBC # BLD: 4.87 M/UL — SIGNIFICANT CHANGE UP (ref 3.8–5.2)
RBC # FLD: 15.7 % — HIGH (ref 10.3–14.5)
RBC # FLD: 15.7 % — HIGH (ref 10.3–14.5)
SODIUM SERPL-SCNC: 137 MMOL/L — SIGNIFICANT CHANGE UP (ref 135–145)
SODIUM SERPL-SCNC: 137 MMOL/L — SIGNIFICANT CHANGE UP (ref 135–145)
TROPONIN I, HIGH SENSITIVITY RESULT: 23 NG/L — SIGNIFICANT CHANGE UP
TROPONIN I, HIGH SENSITIVITY RESULT: 23 NG/L — SIGNIFICANT CHANGE UP
WBC # BLD: 6.58 K/UL — SIGNIFICANT CHANGE UP (ref 3.8–10.5)
WBC # BLD: 6.58 K/UL — SIGNIFICANT CHANGE UP (ref 3.8–10.5)
WBC # FLD AUTO: 6.58 K/UL — SIGNIFICANT CHANGE UP (ref 3.8–10.5)
WBC # FLD AUTO: 6.58 K/UL — SIGNIFICANT CHANGE UP (ref 3.8–10.5)

## 2023-12-11 PROCEDURE — 99223 1ST HOSP IP/OBS HIGH 75: CPT | Mod: GC

## 2023-12-11 PROCEDURE — 93010 ELECTROCARDIOGRAM REPORT: CPT

## 2023-12-11 PROCEDURE — 99285 EMERGENCY DEPT VISIT HI MDM: CPT

## 2023-12-11 RX ORDER — ENOXAPARIN SODIUM 100 MG/ML
40 INJECTION SUBCUTANEOUS EVERY 24 HOURS
Refills: 0 | Status: DISCONTINUED | OUTPATIENT
Start: 2023-12-11 | End: 2023-12-12

## 2023-12-11 RX ORDER — INFLUENZA VIRUS VACCINE 15; 15; 15; 15 UG/.5ML; UG/.5ML; UG/.5ML; UG/.5ML
0.5 SUSPENSION INTRAMUSCULAR ONCE
Refills: 0 | Status: DISCONTINUED | OUTPATIENT
Start: 2023-12-11 | End: 2023-12-12

## 2023-12-11 RX ORDER — LOSARTAN POTASSIUM 100 MG/1
25 TABLET, FILM COATED ORAL DAILY
Refills: 0 | Status: DISCONTINUED | OUTPATIENT
Start: 2023-12-11 | End: 2023-12-12

## 2023-12-11 RX ADMIN — ENOXAPARIN SODIUM 40 MILLIGRAM(S): 100 INJECTION SUBCUTANEOUS at 20:38

## 2023-12-11 RX ADMIN — LOSARTAN POTASSIUM 25 MILLIGRAM(S): 100 TABLET, FILM COATED ORAL at 20:38

## 2023-12-11 NOTE — ED PROVIDER NOTE - CLINICAL SUMMARY MEDICAL DECISION MAKING FREE TEXT BOX
53-year-old female presenting with elevated blood pressure and palpitations.  Patient has no headache or chest pain.  Previous workup in the ED did not reveal etiology of persistent palpitations.  Patient now agreeable to admission.

## 2023-12-11 NOTE — ED ADULT NURSE NOTE - OBJECTIVE STATEMENT
Pt presents to the ED referred from doctor's office for elevated BP. Pt is asymptomatic of HTN. Denies blurry vision, pain/discomfort.

## 2023-12-11 NOTE — H&P ADULT - ATTENDING COMMENTS
Patient seen and examined. Case discussed with Dr. Bunch. In brief, this is a 52 yo F with no known PMHx who presents with elevated BP's and tachycardia to the 110's. She was seen in the ED a few days ago for palpitations and had a negative UTOX and TSH at that time. She was offered admission but declined as she needed to get to a worker's compensation doctor's appointment. Will admit to telemetry for now, check TTE and consult cardiology (Dr. Lundy). Patient expressed concerns that she might have blood clots in her body as that was mentioned to her by both EMS as well as the ED physician during her last visit. D-dimer, however, is <150, so will hold off on CT-A at this time. If no other definitive source can be found, I suggested we may consider CT at that time. Remaining care as noted above. Patient seen and examined. Case discussed with Dr. Bunch. In brief, this is a 54 yo F with no known PMHx who presents with elevated BP's and tachycardia to the 110's. She was seen in the ED a few days ago for palpitations and had a negative UTOX and TSH at that time. She was offered admission but declined as she needed to get to a worker's compensation doctor's appointment. Will admit to telemetry for now, check TTE and consult cardiology (Dr. Lundy). Patient expressed concerns that she might have blood clots in her body as that was mentioned to her by both EMS as well as the ED physician during her last visit. D-dimer, however, is <150, so will hold off on CT-A at this time. If no other definitive source can be found, I suggested we may consider CT at that time. Remaining care as noted above.

## 2023-12-11 NOTE — H&P ADULT - NSHPPHYSICALEXAM_GEN_ALL_CORE
GENERAL: NAD, lying in bed comfortably, overweight   HEAD:  Atraumatic, Normocephalic  EYES: EOMI, PERRLA, conjunctiva and sclera clear  ENT: Moist mucous membranes  NECK: Supple, No JVD  CHEST/LUNG: Clear to auscultation bilaterally; No rales, rhonchi, wheezing, or rubs. Unlabored respirations  HEART: Regular rate and rhythm; No murmurs, rubs, or gallops  ABDOMEN: Bowel sounds present; Soft, Nontender, Nondistended.   EXTREMITIES:  2+ Peripheral Pulses, brisk capillary refill. No clubbing, cyanosis, or edema  NERVOUS SYSTEM:  Alert & Oriented X3, speech clear. No deficits   MSK: FROM all 4 extremities, full and equal strength  SKIN: No rashes or lesions

## 2023-12-11 NOTE — H&P ADULT - PROBLEM SELECTOR PLAN 1
p/w tachycardia, elevated blood pressure   in ED: afebrile, Hr 96, /101, Sat 98% on RA  Trop x1 neg,   TSH normal   Pro-BNP on 12/9 neg  EKG: sinus rhythm, ?LV hypertrophy   will start Losartan 25 mg for now and titrate accordingly  c/w tele and vitals q4  f/u Echo   Dr. Lundy Cardio consult   will hold off on CTA chest as ddimer neg, no tachycardia or hypoxia p/w tachycardia, elevated blood pressure   in ED: afebrile, Hr 96, /101, Sat 98% on RA  Trop x1 neg,   TSH normal   Pro-BNP on 12/9 neg  EKG: sinus rhythm, ?LV hypertrophy   will start Losartan 25 mg for now and titrate accordingly  c/w tele and vitals q4  f/u Echo   Dr. Lundy Cardio consult   will hold off on CTA chest as ddimer neg, no hypoxia p/w tachycardia, elevated blood pressure   in ED: afebrile, Hr 96, /101, Sat 98% on RA  Trop x1 neg,   TSH normal   Pro-BNP on 12/9 neg  EKG: sinus rhythm, ?LV hypertrophy   will start Losartan 25 mg for now and titrate accordingly  c/w tele and vitals q4  f/u HbA1c, lipid panel   f/u Echo   Dr. Lundy Cardio consult   will hold off on CTA chest as ddimer neg, no hypoxia

## 2023-12-11 NOTE — PATIENT PROFILE ADULT - FUNCTIONAL ASSESSMENT - DAILY ACTIVITY 6.
What Type Of Note Output Would You Prefer (Optional)?: Bullet Format How Severe Is Your Rash?: mild Is This A New Presentation, Or A Follow-Up?: Rash 4 = No assist / stand by assistance

## 2023-12-11 NOTE — ED ADULT NURSE NOTE - NSFALLUNIVINTERV_ED_ALL_ED
Bed/Stretcher in lowest position, wheels locked, appropriate side rails in place/Call bell, personal items and telephone in reach/Instruct patient to call for assistance before getting out of bed/chair/stretcher/Non-slip footwear applied when patient is off stretcher/[place] to call system/Physically safe environment - no spills, clutter or unnecessary equipment/Purposeful proactive rounding/Room/bathroom lighting operational, light cord in reach Bed/Stretcher in lowest position, wheels locked, appropriate side rails in place/Call bell, personal items and telephone in reach/Instruct patient to call for assistance before getting out of bed/chair/stretcher/Non-slip footwear applied when patient is off stretcher/[place] to call system/Physically safe environment - no spills, clutter or unnecessary equipment/Purposeful proactive rounding/Room/bathroom lighting operational, light cord in reach

## 2023-12-11 NOTE — ED ADULT TRIAGE NOTE - CHIEF COMPLAINT QUOTE
BIBA from doctors office for elevated blood pressure of 202/118, was seen here on Friday, declined admission  denies chest pain/shortness of breath

## 2023-12-11 NOTE — ED PROVIDER NOTE - OBJECTIVE STATEMENT
53-year-old female, no significant PMH, presenting with elevated blood pressure and tachycardia.  Patient was seen in the emergency department for similar symptoms 3 days ago.  Patient declined admission at that time even though her heart rate did not improve because she needed to go to a doctor's appointment today.  While at the doctor's appointment today patient's continued to be tachycardic and had elevated blood pressure.  Denies any headache, chest pain or trouble breathing.

## 2023-12-11 NOTE — PATIENT PROFILE ADULT - SAFE PLACE TO LIVE
FAMILY HISTORY:  FH: factor V Leiden mutation, sisters mother  FH: heart attack, sister; maternal grandfather  FH: hypertension, mother sisster  FH: lung cancer, maternal grandmother  FH: multiple sclerosis, sister  FH: Parkinson's disease, father  FH: thyroid disease, sisters  FHx: chronic renal disease, mother    
no

## 2023-12-11 NOTE — H&P ADULT - ASSESSMENT
53 yrs old F, from home, pmhx of pre-eclampsia 35 yrs ago s/p , presented with tachycardia and elevated blood pressure. Pt is admitted for hypertensive urgency evaluation and management.

## 2023-12-11 NOTE — H&P ADULT - NSHPREVIEWOFSYSTEMS_GEN_ALL_CORE
REVIEW OF SYSTEMS:    CONSTITUTIONAL: No weakness, fevers or chills  EYES/ENT: No visual changes;  No vertigo or throat pain   NECK: No pain or stiffness  RESPIRATORY: No cough, wheezing, hemoptysis; + shortness of breath, + orthopnea, ?PND   CARDIOVASCULAR: No chest pain or palpitations  GASTROINTESTINAL: No abdominal or epigastric pain. No nausea, vomiting, or hematemesis; No diarrhea or constipation. No melena or hematochezia.  GENITOURINARY: No dysuria, frequency or hematuria  NEUROLOGICAL: No numbness or weakness  SKIN: No itching, rashes

## 2023-12-11 NOTE — PATIENT PROFILE ADULT - FALL HARM RISK - UNIVERSAL INTERVENTIONS
Bed in lowest position, wheels locked, appropriate side rails in place/Call bell, personal items and telephone in reach/Instruct patient to call for assistance before getting out of bed or chair/Non-slip footwear when patient is out of bed/[place] to call system/Physically safe environment - no spills, clutter or unnecessary equipment/Purposeful Proactive Rounding/Room/bathroom lighting operational, light cord in reach Bed in lowest position, wheels locked, appropriate side rails in place/Call bell, personal items and telephone in reach/Instruct patient to call for assistance before getting out of bed or chair/Non-slip footwear when patient is out of bed/[place] to call system/Physically safe environment - no spills, clutter or unnecessary equipment/Purposeful Proactive Rounding/Room/bathroom lighting operational, light cord in reach

## 2023-12-11 NOTE — H&P ADULT - NSHPLABSRESULTS_GEN_ALL_CORE
ACC: [id number] EXAM:  XR CHEST PORTABLE URGENT 1V   ORDERED BY: WENCESLAO GONZALES     PROCEDURE DATE:  12/09/2023          INTERPRETATION:  XR CHEST URGENT dated 12/9/2023 7:19 PM    CLINICAL INFORMATION: Female, 53 years old.  Chest Pain.    PRIOR STUDIES: None    FINDINGS/  IMPRESSION: Heart size, mediastinal and hilar contours are within normal   limits. No acute pulmonary or osseous pathology.    --- End of Report ---            VIVIANA GARZA MD; Attending Radiologist  This document has beenelectronically signed. Dec 10 2023  6:01PM ACC: [id number] EXAM:  XR CHEST PORTABLE URGENT 1V   ORDERED BY: WENCESLAO GONZALES     PROCEDURE DATE:  12/09/2023          INTERPRETATION:  XR CHEST URGENT dated 12/9/2023 7:19 PM    CLINICAL INFORMATION: Female, 53 years old.  Chest Pain.    PRIOR STUDIES: None    FINDINGS/  IMPRESSION: Heart size, mediastinal and hilar contours are within normal   limits. No acute pulmonary or osseous pathology.    --- End of Report ---            VIVIANA GARZA MD; Attending Radiologist  This document has beenelectronically signed. Dec 10 2023  6:01PM

## 2023-12-11 NOTE — H&P ADULT - HISTORY OF PRESENT ILLNESS
53 yrs old F, from home, pmhx of pre-eclampsia 35 yrs ago s/p , presented with tachycardia and elevated blood pressure. Pt state that she recently visited the ED for palpitation upon lying down, sharp chest pain on the left side of the chest only upon palpation or deep breath. Pt denied pressure central chest pain, radiation of the chest pain to the back, neck, arm or jaw. Endorsed dyspnea upon climbing the stairs, ?PND attributed to dry stuffy nose, unable to lay flat due to snoring. Denied abdominal pain, dizziness, N/V/D, urinary symptoms, unilateral weakness or decreased sensation in the extremities or face, slurring of speech, reported of mild posterior headache starting from the neck radiating upwards. Pt anxious if her symptoms are related to blood clot in the body.   After pre-eclampsia, she was told that she is at increased risk of high blood pressure and may require to start medication. Pt endorsed being aware of her body and that something is not "right", stated that she would not like to take medication lifelong, and would like further workup to find underlying condition. Pt has never been to Cardiologist or ever had Echo done before.   PT denied alcohol consumptions, use of marijuana or illicit drugs however smokes about one pack every 2 days since  with unsuccessful attempts in between.

## 2023-12-11 NOTE — ED PROVIDER NOTE - PHYSICAL EXAMINATION
General: well appearing female, no acute distress   HEENT: normocephalic, atraumatic   Respiratory: normal work of breathing, lungs clear to auscultation bilaterally   Cardiac: tachycardic   Abdomen: soft, non-tender, no guarding or rebound   MSK: no swelling or tenderness of lower extremities, moving all extremities spontaneously   Skin: warm, dry   Neuro: A&Ox3  Psych: appropriate affect

## 2023-12-12 ENCOUNTER — TRANSCRIPTION ENCOUNTER (OUTPATIENT)
Age: 53
End: 2023-12-12

## 2023-12-12 VITALS
SYSTOLIC BLOOD PRESSURE: 107 MMHG | DIASTOLIC BLOOD PRESSURE: 69 MMHG | RESPIRATION RATE: 18 BRPM | OXYGEN SATURATION: 100 % | HEART RATE: 56 BPM | TEMPERATURE: 98 F

## 2023-12-12 DIAGNOSIS — G44.209 TENSION-TYPE HEADACHE, UNSPECIFIED, NOT INTRACTABLE: ICD-10-CM

## 2023-12-12 LAB
A1C WITH ESTIMATED AVERAGE GLUCOSE RESULT: 5.8 % — HIGH (ref 4–5.6)
A1C WITH ESTIMATED AVERAGE GLUCOSE RESULT: 5.8 % — HIGH (ref 4–5.6)
CHOLEST SERPL-MCNC: 191 MG/DL — SIGNIFICANT CHANGE UP
CHOLEST SERPL-MCNC: 191 MG/DL — SIGNIFICANT CHANGE UP
ESTIMATED AVERAGE GLUCOSE: 120 MG/DL — HIGH (ref 68–114)
ESTIMATED AVERAGE GLUCOSE: 120 MG/DL — HIGH (ref 68–114)
HDLC SERPL-MCNC: 52 MG/DL — SIGNIFICANT CHANGE UP
HDLC SERPL-MCNC: 52 MG/DL — SIGNIFICANT CHANGE UP
LIPID PNL WITH DIRECT LDL SERPL: 119 MG/DL — HIGH
LIPID PNL WITH DIRECT LDL SERPL: 119 MG/DL — HIGH
NON HDL CHOLESTEROL: 139 MG/DL — HIGH
NON HDL CHOLESTEROL: 139 MG/DL — HIGH
TRIGL SERPL-MCNC: 99 MG/DL — SIGNIFICANT CHANGE UP
TRIGL SERPL-MCNC: 99 MG/DL — SIGNIFICANT CHANGE UP

## 2023-12-12 PROCEDURE — 80061 LIPID PANEL: CPT

## 2023-12-12 PROCEDURE — 83690 ASSAY OF LIPASE: CPT

## 2023-12-12 PROCEDURE — 85379 FIBRIN DEGRADATION QUANT: CPT

## 2023-12-12 PROCEDURE — 93005 ELECTROCARDIOGRAM TRACING: CPT

## 2023-12-12 PROCEDURE — 83036 HEMOGLOBIN GLYCOSYLATED A1C: CPT

## 2023-12-12 PROCEDURE — 36415 COLL VENOUS BLD VENIPUNCTURE: CPT

## 2023-12-12 PROCEDURE — 85025 COMPLETE CBC W/AUTO DIFF WBC: CPT

## 2023-12-12 PROCEDURE — 99285 EMERGENCY DEPT VISIT HI MDM: CPT

## 2023-12-12 PROCEDURE — 99239 HOSP IP/OBS DSCHRG MGMT >30: CPT | Mod: GC

## 2023-12-12 PROCEDURE — 80053 COMPREHEN METABOLIC PANEL: CPT

## 2023-12-12 PROCEDURE — 83735 ASSAY OF MAGNESIUM: CPT

## 2023-12-12 PROCEDURE — 93306 TTE W/DOPPLER COMPLETE: CPT

## 2023-12-12 PROCEDURE — 83880 ASSAY OF NATRIURETIC PEPTIDE: CPT

## 2023-12-12 PROCEDURE — 84484 ASSAY OF TROPONIN QUANT: CPT

## 2023-12-12 RX ORDER — LOSARTAN POTASSIUM 100 MG/1
25 TABLET, FILM COATED ORAL ONCE
Refills: 0 | Status: COMPLETED | OUTPATIENT
Start: 2023-12-12 | End: 2023-12-12

## 2023-12-12 RX ORDER — LOSARTAN POTASSIUM 100 MG/1
1 TABLET, FILM COATED ORAL
Qty: 30 | Refills: 0
Start: 2023-12-12 | End: 2024-01-10

## 2023-12-12 RX ORDER — ATORVASTATIN CALCIUM 80 MG/1
40 TABLET, FILM COATED ORAL AT BEDTIME
Refills: 0 | Status: DISCONTINUED | OUTPATIENT
Start: 2023-12-12 | End: 2023-12-12

## 2023-12-12 RX ORDER — ATORVASTATIN CALCIUM 80 MG/1
1 TABLET, FILM COATED ORAL
Qty: 30 | Refills: 0
Start: 2023-12-12 | End: 2024-01-10

## 2023-12-12 RX ORDER — LOSARTAN POTASSIUM 100 MG/1
50 TABLET, FILM COATED ORAL DAILY
Refills: 0 | Status: DISCONTINUED | OUTPATIENT
Start: 2023-12-13 | End: 2023-12-12

## 2023-12-12 RX ORDER — KETOROLAC TROMETHAMINE 30 MG/ML
15 SYRINGE (ML) INJECTION ONCE
Refills: 0 | Status: DISCONTINUED | OUTPATIENT
Start: 2023-12-12 | End: 2023-12-12

## 2023-12-12 RX ADMIN — Medication 15 MILLIGRAM(S): at 08:59

## 2023-12-12 RX ADMIN — LOSARTAN POTASSIUM 25 MILLIGRAM(S): 100 TABLET, FILM COATED ORAL at 12:29

## 2023-12-12 RX ADMIN — Medication 15 MILLIGRAM(S): at 09:31

## 2023-12-12 NOTE — DISCHARGE NOTE PROVIDER - PROVIDER TOKENS
PROVIDER:[TOKEN:[[id number]],FOLLOWUP:[2 weeks]] PROVIDER:[TOKEN:[[id number]],FOLLOWUP:[2 weeks]] PROVIDER:[TOKEN:[[id number]],FOLLOWUP:[2 weeks]],PROVIDER:[TOKEN:[[id number]],FOLLOWUP:[2 weeks]] PROVIDER:[TOKEN:[[id number]],FOLLOWUP:[2 weeks]],PROVIDER:[TOKEN:[[id number]],FOLLOWUP:[2 weeks]]

## 2023-12-12 NOTE — PROGRESS NOTE ADULT - SUBJECTIVE AND OBJECTIVE BOX
PGY-1 Progress Note discussed with attending    PAGER #: [[phone]] TILL 5:00 PM  PLEASE CONTACT ON CALL TEAM:  - On Call Team (Please refer to Lis) FROM 5:00 PM - 8:30PM  - Nightfloat Team FROM 8:30 -7:30 AM    CHIEF COMPLAINT & BRIEF HOSPITAL COURSE: No acute overnight events. Patient reports a bad HA. Was given pain medication and resolved. Otherwise no other complaints.     INTERVAL HPI/OVERNIGHT EVENTS:   MEDICATIONS  (STANDING):  atorvastatin 40 milliGRAM(s) Oral at bedtime  enoxaparin Injectable 40 milliGRAM(s) SubCutaneous every 24 hours  influenza   Vaccine 0.5 milliLiter(s) IntraMuscular once    MEDICATIONS  (PRN):      REVIEW OF SYSTEMS:  CONSTITUTIONAL: No fever, weight loss, or fatigue  RESPIRATORY: No shortness of breath  CARDIOVASCULAR: No chest pain  GASTROINTESTINAL: No abdominal pain.  GENITOURINARY: No dysuria  NEUROLOGICAL: + headaches  SKIN: No itching, burning, rashes    Vital Signs Last 24 Hrs  T(C): 37 (12 Dec 2023 11:14), Max: 37.2 (11 Dec 2023 23:30)  T(F): 98.6 (12 Dec 2023 11:14), Max: 99 (11 Dec 2023 23:30)  HR: 89 (12 Dec 2023 11:14) (86 - 112)  BP: 151/99 (12 Dec 2023 11:14) (142/82 - 165/103)  BP(mean): --  RR: 18 (12 Dec 2023 11:14) (18 - 20)  SpO2: 98% (12 Dec 2023 11:14) (98% - 100%)    Parameters below as of 12 Dec 2023 11:14  Patient On (Oxygen Delivery Method): room air        PHYSICAL EXAMINATION:  GENERAL: NAD, well built  HEAD:  Atraumatic, Normocephalic  EYES:  conjunctiva and sclera clear  CHEST/LUNG: Clear to auscultation. No rales, rhonchi, wheezing, or rubs  HEART: Regular rate and rhythm; No murmurs, rubs, or gallops  ABDOMEN: Soft, Nontender, Nondistended; Bowel sounds present  NERVOUS SYSTEM:  Alert & Oriented X3,    EXTREMITIES:  2+ Peripheral Pulses, No clubbing, cyanosis, or edema  SKIN: warm dry                          12.7   6.58  )-----------( 291      ( 11 Dec 2023 14:30 )             39.8     12-11    137  |  106  |  14  ----------------------------<  99  3.7   |  29  |  0.88    Ca    10.0      11 Dec 2023 14:30  Mg     1.8     12-11    TPro  7.5  /  Alb  3.5  /  TBili  0.3  /  DBili  x   /  AST  15  /  ALT  26  /  AlkPhos  82  12-11    LIVER FUNCTIONS - ( 11 Dec 2023 14:30 )  Alb: 3.5 g/dL / Pro: 7.5 g/dL / ALK PHOS: 82 U/L / ALT: 26 U/L DA / AST: 15 U/L / GGT: x                   CAPILLARY BLOOD GLUCOSE      RADIOLOGY & ADDITIONAL TESTS:                   PGY-1 Progress Note discussed with attending    PAGER #: [[phone]] TILL 5:00 PM  PLEASE CONTACT ON CALL TEAM:  - On Call Team (Please refer to Lis) FROM 5:00 PM - 8:30PM  - Nightfloat Team FROM 8:30 -7:30 AM    CHIEF COMPLAINT & BRIEF HOSPITAL COURSE: No acute overnight events. Patient reports a bad HA. Was given pain medication and resolved. Otherwise no other complaints.     INTERVAL HPI/OVERNIGHT EVENTS:   MEDICATIONS  (STANDING):  atorvastatin 40 milliGRAM(s) Oral at bedtime  enoxaparin Injectable 40 milliGRAM(s) SubCutaneous every 24 hours  influenza   Vaccine 0.5 milliLiter(s) IntraMuscular once    MEDICATIONS  (PRN):      REVIEW OF SYSTEMS:  CONSTITUTIONAL: No fever, weight loss, or fatigue  RESPIRATORY: No shortness of breath  CARDIOVASCULAR: No chest pain  GASTROINTESTINAL: No abdominal pain.  GENITOURINARY: No dysuria  NEUROLOGICAL: + headaches  SKIN: No itching, burning, rashes    Vital Signs Last 24 Hrs  T(C): 37 (12 Dec 2023 11:14), Max: 37.2 (11 Dec 2023 23:30)  T(F): 98.6 (12 Dec 2023 11:14), Max: 99 (11 Dec 2023 23:30)  HR: 89 (12 Dec 2023 11:14) (86 - 112)  BP: 151/99 (12 Dec 2023 11:14) (142/82 - 165/103)  BP(mean): --  RR: 18 (12 Dec 2023 11:14) (18 - 20)  SpO2: 98% (12 Dec 2023 11:14) (98% - 100%)    Parameters below as of 12 Dec 2023 11:14  Patient On (Oxygen Delivery Method): room air        PHYSICAL EXAMINATION:  GENERAL: NAD, well built  HEAD:  Atraumatic, Normocephalic  EYES:  conjunctiva and sclera clear  CHEST/LUNG: Clear to auscultation. No rales, rhonchi, wheezing, or rubs  HEART: Regular rate and rhythm; No murmurs, rubs, or gallops  ABDOMEN: Soft, Nontender, Nondistended; Bowel sounds present  NERVOUS SYSTEM:  Alert & Oriented X3,    EXTREMITIES:  2+ Peripheral Pulses, No clubbing, cyanosis, or edema  SKIN: warm dry                          12.7   6.58  )-----------( 291      ( 11 Dec 2023 14:30 )             39.8     12-11    137  |  106  |  14  ----------------------------<  99  3.7   |  29  |  0.88    Ca    10.0      11 Dec 2023 14:30  Mg     1.8     12-11    TPro  7.5  /  Alb  3.5  /  TBili  0.3  /  DBili  x   /  AST  15  /  ALT  26  /  AlkPhos  82  12-11    LIVER FUNCTIONS - ( 11 Dec 2023 14:30 )  Alb: 3.5 g/dL / Pro: 7.5 g/dL / ALK PHOS: 82 U/L / ALT: 26 U/L DA / AST: 15 U/L / GGT: x                   CAPILLARY BLOOD GLUCOSE      RADIOLOGY & ADDITIONAL TESTS:

## 2023-12-12 NOTE — DISCHARGE NOTE NURSING/CASE MANAGEMENT/SOCIAL WORK - NSDCPEFALRISK_GEN_ALL_CORE
For information on Fall & Injury Prevention, visit: [email or web address] OR  [email or web address]/[email or web address] OR  https://www.cdc.gov/steadi/patient.html For information on Fall & Injury Prevention, visit: [email or web address]://[email or web address]/[email or web address]-[email or web address]-mobility OR  [email or web address]://[email or web address]/[email or web address]-[email or web address]-[email or web address]-injury OR  https://www.cdc.gov/steadi/patient.html

## 2023-12-12 NOTE — PROGRESS NOTE ADULT - PROBLEM SELECTOR PLAN 1
p/w tachycardia, elevated blood pressure   in ED: afebrile, Hr 96, /101, Sat 98% on RA  Trop x1 neg,   TSH normal   Pro-BNP on 12/9 neg  EKG: sinus rhythm  On losartan 50mg   c/w tele and vitals q4  elevated LDL, started on atorvastatin   f/u Echo   Dr. Lundy Cardio consult

## 2023-12-12 NOTE — CONSULT NOTE ADULT - ASSESSMENT
Patient is a 52 y/o F, from home, PMH of pre-eclampsia 35 yrs ago s/p , and back pain 2/2 cervico-thoraco-lumbar disc degeneration (as per patient) who presented with elevated blood pressure and heart rate. Patient reported that she was in her doctor's office for a worker's compensation appointment when her vitals were checked in office and her HR was 115 and her BP was 202/104. Cardiology was consulted for hypertensive urgency.     #Hypertensive urgency.   - BP outpatient noted to be 202/104.   - Started on losartan 25mg with better controlled BP.   - F/U echocardiogram.   - Counselled at length about needing medication therapy along with lifestyle changes.     #HLD  - LDL - 119  - ASCVD score - 21.3% 10-year risk of cardiovascular event.   - Consider starting moderate-high intensity statin therapy.       INCOMPLETE Patient is a 54 y/o F, from home, PMH of pre-eclampsia 35 yrs ago s/p , and back pain 2/2 cervico-thoraco-lumbar disc degeneration (as per patient) who presented with elevated blood pressure and heart rate. Patient reported that she was in her doctor's office for a worker's compensation appointment when her vitals were checked in office and her HR was 115 and her BP was 202/104. Cardiology was consulted for hypertensive urgency.     #Hypertensive urgency.   - BP outpatient noted to be 202/104.   - Started on losartan 25mg with better controlled BP.   - F/U echocardiogram.   - Counselled at length about needing medication therapy along with lifestyle changes.     #HLD  - LDL - 119  - ASCVD score - 21.3% 10-year risk of cardiovascular event.   - Consider starting moderate-high intensity statin therapy.       INCOMPLETE Patient is a 54 y/o F, from home, PMH of pre-eclampsia 35 yrs ago s/p , and back pain 2/2 cervico-thoraco-lumbar disc degeneration (as per patient) who presented with elevated blood pressure and heart rate. Patient reported that she was in her doctor's office for a worker's compensation appointment when her vitals were checked in office and her HR was 115 and her BP was 202/104. Cardiology was consulted for hypertensive urgency.     #Hypertensive urgency.   - BP outpatient noted to be 202/104.   - C/w on losartan 25mg.   - F/U echocardiogram.   - Counselled at length about needing medication therapy along with lifestyle changes.     #HLD  - LDL - 119  - ASCVD score - 21.3% 10-year risk of cardiovascular event.   - Consider starting moderate-high intensity statin therapy.

## 2023-12-12 NOTE — DISCHARGE NOTE PROVIDER - ATTENDING DISCHARGE PHYSICAL EXAMINATION:
Vital Signs Last 24 Hrs  T(C): 36.8 (12 Dec 2023 16:09), Max: 37.2 (11 Dec 2023 23:30)  T(F): 98.2 (12 Dec 2023 16:09), Max: 99 (11 Dec 2023 23:30)  HR: 56 (12 Dec 2023 16:09) (56 - 112)  BP: 107/69 (12 Dec 2023 16:09) (107/69 - 163/99)  BP(mean): --  RR: 18 (12 Dec 2023 16:09) (18 - 20)  SpO2: 100% (12 Dec 2023 16:09) (98% - 100%)    Parameters below as of 12 Dec 2023 16:09  Patient On (Oxygen Delivery Method): room air    CONSTITUTIONAL: Well appearing, well nourished, awake, alert and in no apparent distress  ENMT: Airway patent, Nasal mucosa clear. Mouth with normal mucosa. Throat has no vesicles, no oropharyngeal exudates and uvula is midline.  EYES: Clear bilaterally, pupils equal, round and reactive to light. EOMI.  CARDIAC: Normal rate, regular rhythm.  Heart sounds S1, S2.  No murmurs, rubs or gallops   RESPIRATORY: Breath sounds clear and equal bilaterally. No wheezes, rhales or rhonchi  MUSCULOSKELETAL: Spine appears normal, range of motion is not limited, no muscle or joint tenderness  EXTREMITIES: No edema, cyanosis or deformity   NEUROLOGICAL: Alert and oriented, no focal deficits, no motor or sensory deficits.  SKIN: No rash, skin turgor

## 2023-12-12 NOTE — DISCHARGE NOTE PROVIDER - NSDCCPCAREPLAN_GEN_ALL_CORE_FT
PRINCIPAL DISCHARGE DIAGNOSIS  Diagnosis: Hypertensive urgency  Assessment and Plan of Treatment: You came into the hospital due to elevated blood pressure. In the emergency room your blood pressure was 176/101. Your cardiac enzymes  were normal. Your thyroid function was normal. You ekg showed normal rythm. Your heart rate is elevated. We monitored your heart rate carefully. We put you on a medication called losartan. Please continue to take this medication daily.  We consulted our cardiologist team. We  reccommend you follow outpatient with a cardiologist and primary care for further managment.      SECONDARY DISCHARGE DIAGNOSES  Diagnosis: Tachycardia  Assessment and Plan of Treatment: see above.    Diagnosis: HLD (hyperlipidemia)  Assessment and Plan of Treatment: You have hyperlipidemia. Please continue take Atorvastatin 40mg daily. Your LDL was elevated.  Maintain a healthy diet that consist of low sugar, low fat, low sodium. Decrease the amount of fried foods that you consume. Exercise frequently if possible. Please follow up with  your primary care provider within 1 week of your discharge.  You are recommended a DASH Diet that emphasizes mostly vegetables, fruits, and fat-free or low-fat dairy products. Please limit intake of sodium, sweets, beverages w/ high sugar/carbohydrate content.  Please follow up with your primary care doctor for further management.        PRINCIPAL DISCHARGE DIAGNOSIS  Diagnosis: Hypertensive urgency  Assessment and Plan of Treatment: You came into the hospital due to elevated blood pressure. In the emergency room your blood pressure was 176/101. Your cardiac enzymes  were normal. Your thyroid function was normal. You ekg showed normal rythm. Your heart rate is elevated. We monitored your heart rate carefully. We put you on a medication called losartan. Please continue to take LOSARTAN 50mg ONCE DAILY. Pick it up from your pharmacy.  We consulted our cardiologist team. We  reccommend you follow outpatient with a cardiologist and primary care for further managment.      SECONDARY DISCHARGE DIAGNOSES  Diagnosis: Sinus tachycardia  Assessment and Plan of Treatment: As above    Diagnosis: HLD (hyperlipidemia)  Assessment and Plan of Treatment: You have hyperlipidemia. Please continue take Atorvastatin 40mg daily. Your LDL was elevated.  Maintain a healthy diet that consist of low sugar, low fat, low sodium. Decrease the amount of fried foods that you consume. Exercise frequently if possible. Please follow up with  your primary care provider within 1 week of your discharge.  You are recommended a DASH Diet that emphasizes mostly vegetables, fruits, and fat-free or low-fat dairy products. Please limit intake of sodium, sweets, beverages w/ high sugar/carbohydrate content.  Please follow up with your primary care doctor for further management.        PRINCIPAL DISCHARGE DIAGNOSIS  Diagnosis: Hypertensive urgency  Assessment and Plan of Treatment: You came into the hospital due to elevated blood pressure. In the emergency room your blood pressure was 176/101. Your cardiac enzymes  were normal. Your thyroid function was normal. You ekg showed normal rythm. Your heart rate is elevated. We monitored your heart rate carefully. We put you on a medication called losartan. Please continue to take LOSARTAN 50mg ONCE DAILY. Pick it up from your pharmacy.  We consulted our cardiologist team. We  reccommend you follow outpatient with a cardiologist and primary care for further managment, within 2 weeks after discharge.      SECONDARY DISCHARGE DIAGNOSES  Diagnosis: Sinus tachycardia  Assessment and Plan of Treatment: As above    Diagnosis: HLD (hyperlipidemia)  Assessment and Plan of Treatment: You have hyperlipidemia. Please continue take Atorvastatin 40mg daily. Your LDL was elevated.  Maintain a healthy diet that consist of low sugar, low fat, low sodium. Decrease the amount of fried foods that you consume. Exercise frequently if possible. Please follow up with  your primary care provider within 1 week of your discharge.  You are recommended a DASH Diet that emphasizes mostly vegetables, fruits, and fat-free or low-fat dairy products. Please limit intake of sodium, sweets, beverages w/ high sugar/carbohydrate content.  Please follow up with your primary care doctor for further management.        PRINCIPAL DISCHARGE DIAGNOSIS  Diagnosis: Hypertensive urgency  Assessment and Plan of Treatment: You came into the hospital due to elevated blood pressure. In the emergency room your blood pressure was 176/101. Your cardiac enzymes were normal. We put you on a medication called losartan. Please continue to take LOSARTAN 50mg ONCE DAILY. Pick it up from your pharmacy.  We consulted our cardiologist team. We  reccommend you follow outpatient with a cardiologist and primary care for further managment, within 2 weeks after discharge.      SECONDARY DISCHARGE DIAGNOSES  Diagnosis: Sinus tachycardia  Assessment and Plan of Treatment: Your heart rate is elevated.  Your thyroid function was normal. You ekg showed normal rythm. You did not seem to have any urinary tract infection (urinalysis was negative and you did not have symptoms) or pneumonia(chest xray was negative and you did not have symptoms) that could have caused your heart rate to go up. D-dimer was negative and there was low suspicion for pulmonary embolism. We monitored your heart rate carefully. We  reccommend you follow outpatient with a cardiologist and primary care for further managment, within 2 weeks after discharge.    Diagnosis: HLD (hyperlipidemia)  Assessment and Plan of Treatment: You have hyperlipidemia. Please continue take Atorvastatin 40mg daily. Your LDL was elevated.  Maintain a healthy diet that consist of low sugar, low fat, low sodium. Decrease the amount of fried foods that you consume. Exercise frequently if possible. Please follow up with  your primary care provider within 1 week of your discharge.  You are recommended a DASH Diet that emphasizes mostly vegetables, fruits, and fat-free or low-fat dairy products. Please limit intake of sodium, sweets, beverages w/ high sugar/carbohydrate content.  Please follow up with your primary care doctor within 2 weeks after discharge for medication adjustment.        PRINCIPAL DISCHARGE DIAGNOSIS  Diagnosis: Hypertensive urgency  Assessment and Plan of Treatment: You came into the hospital due to elevated blood pressure. In the emergency room your blood pressure was 176/101. Your cardiac enzymes were normal. We put you on a medication called losartan. Please continue to take LOSARTAN 50mg ONCE DAILY. Pick it up from your pharmacy.  We consulted our cardiologist team. We  reccommend you follow outpatient with a cardiologist and primary care for further managment, within 2 weeks after discharge.      SECONDARY DISCHARGE DIAGNOSES  Diagnosis: HLD (hyperlipidemia)  Assessment and Plan of Treatment: You have hyperlipidemia. Please continue take Atorvastatin 40mg daily. Your LDL was elevated.  Maintain a healthy diet that consist of low sugar, low fat, low sodium. Decrease the amount of fried foods that you consume. Exercise frequently if possible. Please follow up with  your primary care provider within 1 week of your discharge.  You are recommended a DASH Diet that emphasizes mostly vegetables, fruits, and fat-free or low-fat dairy products. Please limit intake of sodium, sweets, beverages w/ high sugar/carbohydrate content.  Please follow up with your primary care doctor within 2 weeks after discharge for medication adjustment.       Diagnosis: Sinus tachycardia  Assessment and Plan of Treatment: Your heart rate is elevated.  Your thyroid function was normal. You ekg showed normal rythm. You did not seem to have any urinary tract infection (urinalysis was negative and you did not have symptoms) or pneumonia(chest xray was negative and you did not have symptoms) that could have caused your heart rate to go up. D-dimer was negative and there was low suspicion for pulmonary embolism. We monitored your heart rate carefully.   We  reccommend you follow outpatient with a cardiologist and primary care for further managment, within 2 weeks after discharge. If you continue to have palpiations, please follow with Dr. Lundy and may get loop recorder to evaluate for any underlying abnormal ryhthm,

## 2023-12-12 NOTE — DISCHARGE NOTE PROVIDER - CARE PROVIDER_API CALL
Madeleine Glass  [id number] [facility], [facility] [place] [address]  Phone: [phone]  Fax: ()-  Follow Up Time: 2 weeks   Madeleine Glass  [id number] [facility], [facility] [place] [address]  Phone: [phone]  Fax: ()-  Follow Up Time: 2 weeks   Madeleine Glass  [address] [facility], [facility] [place] [address]  Phone: [phone]  Fax: ()-  Follow Up Time: 2 weeks    Víctor Lundy  Cardiology  [address] [place] [id number]  Phone: [phone]  Fax: [phone]  Follow Up Time: 2 weeks   Madeleine Glass  [address] [facility], [facility] [place] [address]  Phone: [phone]  Fax: ()-  Follow Up Time: 2 weeks    Víctor Lundy  Cardiology  [address] [place] [id number]  Phone: [phone]  Fax: [phone]  Follow Up Time: 2 weeks

## 2023-12-12 NOTE — CONSULT NOTE ADULT - SUBJECTIVE AND OBJECTIVE BOX
CHIEF COMPLAINT: Palpitations.     HPI: Patient is a 52 y/o F, from home, PMH of pre-eclampsia 35 yrs ago s/p , and back pain 2/2 cervico-thoraco-lumbar disc degeneration (as per patient) who presented with elevated blood pressure and heart rate. Patient reported that she was in her doctor's office for a worker's compensation appointment when her vitals were checked in office and her HR was 115 and her BP was 202/104. Patient reports that 3 days ago she had presented to the ED because of severe palpitations described by patient as 'pounding of the heart'. Patient reported that at that time patient had a sense of doom and felt like a panic attack. Patient denies associated chest pain and shortness of breath at the time. Patient reports she sometimes has sharp chest pain when she is taking a deep breath but denies associated with physical exertion. Patient also denies SOB with exertion. Patient also denies orthopnea or PND like symptoms. Patient reports that she snores and sometimes wake up feeling like she can not catch a breath, but associates these symptoms with dryness and congested nose. Patient denies any other complains at this time.     Patient reports that she smokes half a pack a day for over 25 years.   Patient denies ever being told that she has hypertension. Patient reports until 3 months ago her blood pressures at doctor's offices were always under control. Patient reported she does not believe she has hypertension and is convinced that there is 'something else going on with my body causing high heart rate and blood pressure'.   Patient denies any prior echocardiograms, stress test or cardiac catheterizations.     PAST MEDICAL & SURGICAL HISTORY:  Lumbar herniated disc      Preeclampsia          Allergies    No Known Allergies    Intolerances        MEDICATIONS  (STANDING):  atorvastatin 40 milliGRAM(s) Oral at bedtime  enoxaparin Injectable 40 milliGRAM(s) SubCutaneous every 24 hours  influenza   Vaccine 0.5 milliLiter(s) IntraMuscular once  losartan 25 milliGRAM(s) Oral daily    MEDICATIONS  (PRN):      FAMILY HISTORY:    No family history of premature coronary artery disease or sudden cardiac death    SOCIAL HISTORY:  Smoking- 0.5 pack a day for over 25 years.   Alcohol-  Illicit Drug use-    REVIEW OF SYSTEMS:  Constitutional: [ ] fever, [ ]weight loss,  [ ]fatigue  Eyes: [ ] visual changes  Respiratory: [ ]shortness of breath;  [ ] cough, [ ]wheezing, [ ]chills, [ ]hemoptysis  Cardiovascular: [ ] chest pain, [X]palpitations, [ ]dizziness,  [ ]leg swelling [ ]syncope  Gastrointestinal: [ ] abdominal pain, [ ]nausea, [ ]vomiting,  [ ]diarrhea   Genitourinary: [ ] dysuria, [ ] hematuria  Neurologic: [ ] headaches [ ] tremors  [ ] weakness [ ] lightheadedness  Skin: [ ] itching, [ ]burning, [ ] rashes  Endocrine: [ ] heat or cold intolerance  Musculoskeletal: [ ] joint pain or swelling; [ ] muscle, back, or extremity pain  Psychiatric: [ ] depression, [ ]anxiety, [ ]mood swings, or [ ]difficulty sleeping  Hematologic: [ ] easy bruising, [ ] bleeding gums       [ x] All others negative	  [ ] Unable to obtain    Vital Signs Last 24 Hrs  T(C): 36.9 (12 Dec 2023 07:45), Max: 37.2 (11 Dec 2023 23:30)  T(F): 98.5 (12 Dec 2023 07:45), Max: 99 (11 Dec 2023 23:30)  HR: 87 (12 Dec 2023 07:45) (86 - 112)  BP: 163/99 (12 Dec 2023 07:45) (142/82 - 176/101)  BP(mean): --  RR: 18 (12 Dec 2023 07:45) (18 - 22)  SpO2: 99% (12 Dec 2023 07:45) (98% - 100%)    Parameters below as of 12 Dec 2023 07:45  Patient On (Oxygen Delivery Method): room air      I&O's Summary    12 Dec 2023 07:01  -  12 Dec 2023 10:42  --------------------------------------------------------  IN: 220 mL / OUT: 0 mL / NET: 220 mL        PHYSICAL EXAM:  General: No acute distress  HEENT: EOMI, PERRL  Neck: Supple  Lungs: Clear to auscultation bilaterally; No rales or wheezing  Heart: Regular rate and rhythm; No murmurs, rubs, or gallops  Abdomen: Nontender, bowel sounds present  Extremities: No edema  Nervous system:  Alert & Oriented X3, no focal deficits  Psychiatric: Normal affect  Skin: No rashes or lesions      LABS:      137  |  106  |  14  ----------------------------<  99  3.7   |  29  |  0.88    Ca    10.0      11 Dec 2023 14:30  Mg     1.8         TPro  7.5  /  Alb  3.5  /  TBili  0.3  /  DBili  x   /  AST  15  /  ALT  26  /  AlkPhos  82  12    Creatinine Trend: 0.88<--, 1.01<--                        12.7   6.58  )-----------( 291      ( 11 Dec 2023 14:30 )             39.8         Lipid Panel: Cholesterol, Serum 191  Direct LDL --  HDL Cholesterol, Serum 52  Triglycerides, Serum 99    Cardiac Enzymes:           RADIOLOGY:    ECG [my interpretation]:    TELEMETRY:    ECHO:    STRESS TEST:    CATHETERIZATION:

## 2023-12-12 NOTE — DISCHARGE NOTE NURSING/CASE MANAGEMENT/SOCIAL WORK - PATIENT PORTAL LINK FT
You can access the FollowMyHealth Patient Portal offered by [facility] by registering at the following website: [email or web address]. By joining [facility]’s FollowMyHealth portal, you will also be able to view your health information using other applications (apps) compatible with our system. You can access the FollowMyHealth Patient Portal offered by [facility] by registering at the following website: [email or web address]. By joining [facility]’s FollowMyHealth portal, you will also be able to view your health information using other applications (apps) compatible with our system.

## 2023-12-12 NOTE — DISCHARGE NOTE PROVIDER - HOSPITAL COURSE
53 yrs old F, from home, pmhx of pre-eclampsia 35 yrs ago s/p , presented with tachycardia and elevated blood pressure. Pt is admitted for hypertensive urgency evaluation and management. In ED patient is afebrile, Hr 96, /101, Sat 98% on RA. Trop x1 neg,   TSH normal. EKG: sinus rhythm. Tachycardia on tele. Started on Losartan 25 mg and BP came down to 150s/160s. Cardiology consulted. No need for echo. Sent home on Lorsartan 50mg. 53 yrs old F, from home, pmhx of pre-eclampsia 35 yrs ago s/p , presented with tachycardia and elevated blood pressure. Pt is admitted for hypertensive urgency evaluation and management. In ED patient is afebrile, Hr 96, /101, Sat 98% on RA. Trop x1 neg,   TSH normal. EKG: sinus rhythm. Tachycardia on tele. Started on Losartan 25 mg and BP came down to 150s/160s. Cardiology consulted. No need for echo. Sent home on Lorsartan 50mg.   Patient is stable for discharge. Patient has been advised to follow up as outpatient. Case has been discussed with the attending. 53 yrs old F, from home, pmhx of pre-eclampsia 35 yrs ago s/p , presented with tachycardia and elevated blood pressure. Pt is admitted for hypertensive urgency evaluation and management.    In ED patient is afebrile, Hr 96, /101, Sat 98% on RA. Trop x1 neg, TSH normal. EKG: sinus rhythm. Sinus tachycardia on tele. Started on Losartan 25 mg.    For HTN urgency, BP came down to 150s/160s. Cardiology consulted. No need for echo. Sent home on Lorsartan 50mg.     Patient is stable for discharge. Patient has been advised to follow up as outpatient. Case has been discussed with the attending.

## 2023-12-12 NOTE — DISCHARGE NOTE PROVIDER - NSDCMRMEDTOKEN_GEN_ALL_CORE_FT
atorvastatin 40 mg oral tablet: 1 tab(s) orally once a day (at bedtime)  losartan 50 mg oral tablet: 1 tab(s) orally   atorvastatin 40 mg oral tablet: 1 tab(s) orally once a day (at bedtime)  losartan 50 mg oral tablet: 1 tab(s) orally once a day

## 2023-12-12 NOTE — CONSULT NOTE ADULT - TIME BILLING
- Review of records, telemetry, vital signs and daily labs.   - General and cardiovascular physical examination.  - Generation of cardiovascular treatment plan.  - Coordination of care.      Patient was seen and examined by me on 12/12/23,interim events noted,labs and radiology studies reviewed.  Víctor Lundy MD,FACC.  [address] [place], [place] [id number].  [phone] - Review of records, telemetry, vital signs and daily labs.   - General and cardiovascular physical examination.  - Generation of cardiovascular treatment plan.  - Coordination of care.      Patient was seen and examined by me on 12/12/23,interim events noted,labs and radiology studies reviewed.  Víctor Lundy MD,FACC.  [address] [place], [place] [id number].  [phone]

## 2023-12-12 NOTE — DISCHARGE NOTE PROVIDER - YES NO FOR MLM POSITIVE OR NEGATIVE COVID RESULT
11/11 sent back in as daily   
[facility] CALLED AND STATED THAT THE BELOW RX NEEDS TO BE CORRECTED AND SENT BACK IN PLEASE. THE SIG SHE SAY DAILY    Insulin Glargine, 1 Unit Dial, (Toujeo SoloStar) 300 UNIT/ML solution pen-injector injection [[id number]]      Sig: INJECT 100 UNITS UNDER THE SKIN INTO THE APPROPRIATE AREA AS DIRECTED ONE TIME PER WEEK  
,

## 2023-12-21 ENCOUNTER — TRANSCRIPTION ENCOUNTER (OUTPATIENT)
Age: 53
End: 2023-12-21

## 2023-12-28 PROBLEM — O14.90 UNSPECIFIED PRE-ECLAMPSIA, UNSPECIFIED TRIMESTER: Chronic | Status: ACTIVE | Noted: 2023-12-11

## 2024-01-10 ENCOUNTER — APPOINTMENT (OUTPATIENT)
Dept: CARDIOLOGY | Facility: CLINIC | Age: 54
End: 2024-01-10
